# Patient Record
Sex: MALE | URBAN - METROPOLITAN AREA
[De-identification: names, ages, dates, MRNs, and addresses within clinical notes are randomized per-mention and may not be internally consistent; named-entity substitution may affect disease eponyms.]

---

## 2022-03-09 ENCOUNTER — OFFICE VISIT (OUTPATIENT)
Dept: ORTHOPEDIC SURGERY | Age: 47
End: 2022-03-09
Payer: COMMERCIAL

## 2022-03-09 VITALS
BODY MASS INDEX: 34.86 KG/M2 | OXYGEN SATURATION: 99 % | HEART RATE: 63 BPM | HEIGHT: 68 IN | TEMPERATURE: 96.3 F | WEIGHT: 230 LBS

## 2022-03-09 DIAGNOSIS — M25.562 LEFT KNEE PAIN, UNSPECIFIED CHRONICITY: Primary | ICD-10-CM

## 2022-03-09 DIAGNOSIS — M17.12 PRIMARY OSTEOARTHRITIS OF LEFT KNEE: ICD-10-CM

## 2022-03-09 PROCEDURE — 73562 X-RAY EXAM OF KNEE 3: CPT | Performed by: ORTHOPAEDIC SURGERY

## 2022-03-09 PROCEDURE — 99203 OFFICE O/P NEW LOW 30 MIN: CPT | Performed by: ORTHOPAEDIC SURGERY

## 2022-03-09 RX ORDER — ALBUTEROL SULFATE 90 UG/1
AEROSOL, METERED RESPIRATORY (INHALATION)
COMMUNITY
Start: 2022-01-25

## 2022-03-09 RX ORDER — BENZONATATE 100 MG/1
CAPSULE ORAL
COMMUNITY
Start: 2022-01-07

## 2022-03-09 NOTE — PROGRESS NOTES
Patient: Sharyle Sizer                MRN: [id number]       SSN: [id number]  YOB: 1975        AGE: 48 y.o. SEX: male  Body mass index is 34.97 kg/m². PCP: Meaghan Camacho MD  03/09/22    CHIEF COMPLAINT: Left knee pain 7/10    HPI: Sharyle Sizer is a 48 y.o. male patient who presents to the office today with left knee pain. He has been having left knee pain for several months. He says it is intermittent and off and on. It is worse when he stands for long periods of time. He also notices occasional swelling. He uses a topical anti-inflammatory for the pain. Past Medical History:   Diagnosis Date    Arthritis        History reviewed. No pertinent family history. Current Outpatient Medications   Medication Sig Dispense Refill    albuterol (PROVENTIL HFA, VENTOLIN HFA, PROAIR HFA) 90 mcg/actuation inhaler inhale 2 puffs by mouth four times a day if needed      benzonatate (TESSALON) 100 mg capsule take 1 capsule by mouth three times a day if needed         Not on File    History reviewed. No pertinent surgical history. Social History     Socioeconomic History    Marital status: SINGLE     Spouse name: Not on file    Number of children: Not on file    Years of education: Not on file    Highest education level: Not on file   Occupational History    Not on file   Tobacco Use    Smoking status: Not on file    Smokeless tobacco: Not on file   Substance and Sexual Activity    Alcohol use: Not on file    Drug use: Not on file    Sexual activity: Not on file   Other Topics Concern    Not on file   Social History Narrative    Not on file     Social Determinants of Health     Financial Resource Strain:     Difficulty of Paying Living Expenses: Not on file   Food Insecurity:     Worried About Running Out of Food in the Last Year: Not on file    Christian of Food in the Last Year: Not on file   Transportation Needs:     Lack of Transportation (Medical):  Not on file    Lack of Transportation (Non-Medical): Not on file   Physical Activity:     Days of Exercise per Week: Not on file    Minutes of Exercise per Session: Not on file   Stress:     Feeling of Stress : Not on file   Social Connections:     Frequency of Communication with Friends and Family: Not on file    Frequency of Social Gatherings with Friends and Family: Not on file    Attends Bahai Services: Not on file    Active Member of [address] [place] [address] or Organizations: Not on file    Attends Club or Organization Meetings: Not on file    Marital Status: Not on file   Intimate Partner Violence:     Fear of Current or Ex-Partner: Not on file    Emotionally Abused: Not on file    Physically Abused: Not on file    Sexually Abused: Not on file   Housing Stability:     Unable to Pay for Housing in the Last Year: Not on file    Number of [place] in the Last Year: Not on file    Unstable Housing in the Last Year: Not on file       REVIEW OF SYSTEMS:    14 point review of systems on the intake form is negative except as noted in the HPI    PHYSICAL EXAMINATION:  Visit Vitals  Pulse 63   Temp (!) 96.3 °F (35.7 °C) (Temporal)   Ht 5' 8\" (1.727 m)   Wt 230 lb (104.3 kg)   SpO2 99%   BMI 34.97 kg/m²     Body mass index is 34.97 kg/m². GENERAL: Alert and oriented x3, in no acute distress, well-developed, well-nourished. HEENT: Normocephalic, atraumatic.     Knee Examination      R   L  Effusion   -   -  Warmth   -   -  Erythema   -   -  ROM   Extension  Full   Full   Flexion   Full   Full  Tenderness   Medial Joint  -   -   Lateral Joint  -   -   Posterior knee  -   -  Strength   Quad   5   5   Hamstring  5   5  Crepitus   Tibiofemoral   -   +   Patellofemoral  -   +  Instability   Anterior  -   -   Posterior  -   -   Patellofemoral  -   -  Lachman's   -   -  Anterior Drawer  -   -  Posterior Drawer  -   -  Kathrin's   Pain   -   -   Locking  -   -  Calf TTP   -   -  Straight Leg Raise  -   -      IMAGING:  X-rays 3 views of the left knee were taken today. My interpretation is x-rays is mild left knee osteoarthritis, tricompartmental      ASSESSMENT & PLAN  Diagnosis: Left knee mild osteoarthritis    Luis Fernando has mild but symptomatic left knee osteoarthritis. I recommended anti-inflammatory medication either topical or oral as needed for the pain. If his symptoms persist or worsen we did discuss the possibility of a repeat visit with corticosteroid injection. I would like to see him back as needed. Prescription medication management discussed. Electronically signed by: Farhat Valdez MD    Note: This note was completed using voice recognition software.   Any typographical/name errors or mistakes are unintentional.